# Patient Record
Sex: MALE | Race: BLACK OR AFRICAN AMERICAN | NOT HISPANIC OR LATINO | Employment: FULL TIME | URBAN - METROPOLITAN AREA
[De-identification: names, ages, dates, MRNs, and addresses within clinical notes are randomized per-mention and may not be internally consistent; named-entity substitution may affect disease eponyms.]

---

## 2017-04-01 PROCEDURE — 12032 INTMD RPR S/A/T/EXT 2.6-7.5: CPT

## 2017-04-01 PROCEDURE — 99285 EMERGENCY DEPT VISIT HI MDM: CPT | Mod: 25

## 2017-04-01 RX ORDER — ACETAMINOPHEN 325 MG/1
650 TABLET ORAL
Status: COMPLETED | OUTPATIENT
Start: 2017-04-01 | End: 2017-04-02

## 2017-04-01 RX ORDER — LIDOCAINE HYDROCHLORIDE 10 MG/ML
10 INJECTION INFILTRATION; PERINEURAL
Status: COMPLETED | OUTPATIENT
Start: 2017-04-01 | End: 2017-04-02

## 2017-04-02 ENCOUNTER — HOSPITAL ENCOUNTER (EMERGENCY)
Facility: HOSPITAL | Age: 27
Discharge: HOME | End: 2017-04-02
Attending: EMERGENCY MEDICINE

## 2017-04-02 VITALS
HEART RATE: 76 BPM | DIASTOLIC BLOOD PRESSURE: 67 MMHG | TEMPERATURE: 99 F | WEIGHT: 250 LBS | OXYGEN SATURATION: 97 % | SYSTOLIC BLOOD PRESSURE: 127 MMHG | BODY MASS INDEX: 33.86 KG/M2 | RESPIRATION RATE: 14 BRPM | HEIGHT: 72 IN

## 2017-04-02 DIAGNOSIS — S01.01XA SCALP LACERATION, INITIAL ENCOUNTER: ICD-10-CM

## 2017-04-02 DIAGNOSIS — S02.91XA CLOSED FRACTURE OF SKULL, UNSPECIFIED BONE, INITIAL ENCOUNTER: ICD-10-CM

## 2017-04-02 DIAGNOSIS — S06.9X1A HEAD INJURY, CLOSED, WITH BRIEF LOC: Primary | ICD-10-CM

## 2017-04-02 PROCEDURE — 90471 IMMUNIZATION ADMIN: CPT | Performed by: EMERGENCY MEDICINE

## 2017-04-02 PROCEDURE — 63600175 PHARM REV CODE 636 W HCPCS: Performed by: EMERGENCY MEDICINE

## 2017-04-02 PROCEDURE — 25000003 PHARM REV CODE 250: Performed by: EMERGENCY MEDICINE

## 2017-04-02 PROCEDURE — 90715 TDAP VACCINE 7 YRS/> IM: CPT | Performed by: EMERGENCY MEDICINE

## 2017-04-02 RX ORDER — ONDANSETRON 4 MG/1
4 TABLET, ORALLY DISINTEGRATING ORAL
Status: COMPLETED | OUTPATIENT
Start: 2017-04-02 | End: 2017-04-02

## 2017-04-02 RX ORDER — BUTALBITAL, ACETAMINOPHEN AND CAFFEINE 50; 325; 40 MG/1; MG/1; MG/1
1 TABLET ORAL EVERY 4 HOURS PRN
Qty: 12 TABLET | Refills: 0 | Status: SHIPPED | OUTPATIENT
Start: 2017-04-02 | End: 2017-05-02

## 2017-04-02 RX ORDER — ONDANSETRON 4 MG/1
4 TABLET, ORALLY DISINTEGRATING ORAL EVERY 8 HOURS PRN
Qty: 12 TABLET | Refills: 0 | Status: SHIPPED | OUTPATIENT
Start: 2017-04-02

## 2017-04-02 RX ADMIN — BACITRACIN, NEOMYCIN, POLYMYXIN B 1 EACH: 400; 3.5; 5 OINTMENT TOPICAL at 05:04

## 2017-04-02 RX ADMIN — CLOSTRIDIUM TETANI TOXOID ANTIGEN (FORMALDEHYDE INACTIVATED), CORYNEBACTERIUM DIPHTHERIAE TOXOID ANTIGEN (FORMALDEHYDE INACTIVATED), BORDETELLA PERTUSSIS TOXOID ANTIGEN (GLUTARALDEHYDE INACTIVATED), BORDETELLA PERTUSSIS FILAMENTOUS HEMAGGLUTININ ANTIGEN (FORMALDEHYDE INACTIVATED), BORDETELLA PERTUSSIS PERTACTIN ANTIGEN, AND BORDETELLA PERTUSSIS FIMBRIAE 2/3 ANTIGEN 0.5 ML: 5; 2; 2.5; 5; 3; 5 INJECTION, SUSPENSION INTRAMUSCULAR at 12:04

## 2017-04-02 RX ADMIN — ACETAMINOPHEN 650 MG: 325 TABLET, FILM COATED ORAL at 12:04

## 2017-04-02 RX ADMIN — LIDOCAINE HYDROCHLORIDE 10 ML: 10 INJECTION, SOLUTION INFILTRATION; PERINEURAL at 02:04

## 2017-04-02 RX ADMIN — ONDANSETRON 4 MG: 4 TABLET, ORALLY DISINTEGRATING ORAL at 12:04

## 2017-04-02 NOTE — ED NOTES
Report received from Marshall DE SANTIAGO RN. Patient in no acute distress, respirations even and unlabored, alert awake and oriented. Patient reports nausea and head pain.

## 2017-04-02 NOTE — ED PROVIDER NOTES
Encounter Date: 4/1/2017    SCRIBE #1 NOTE: I, Nathaly Whitmore, am scribing for, and in the presence of,  Ebonie Torres MD. I have scribed the following portions of the note - Other sections scribed: HPI/ROS.       History     Chief Complaint   Patient presents with    Head Injury     Pt reports falling while skating and hitting head and having an LOC.  Laceration to back of head.  Reports pain to head and back of head.      Review of patient's allergies indicates:  No Known Allergies  HPI Comments: CC: Head Injury     HPI: 26 y.o. M with no PMHx presents to the ED via EMS for a head injury PTA. Pt was skating when he tripped on carpet and hit the back of his head on the ground. Friend reports of LOC for less than 10 minutes. Pt is c/o a HA, posterior neck pain, and a laceration to posterior scalp. Symptoms are severe. EMS bandaged the wound. Pt denies N/V, numbness, and any other symptoms.     The history is provided by the patient.     History reviewed. No pertinent past medical history.  History reviewed. No pertinent surgical history.  Family History   Problem Relation Age of Onset    No Known Problems Mother     No Known Problems Father      Social History   Substance Use Topics    Smoking status: Never Smoker    Smokeless tobacco: Never Used    Alcohol use No     Review of Systems   Constitutional: Negative for fever.   HENT: Negative for nosebleeds.    Eyes: Negative for pain and visual disturbance.   Respiratory: Negative for shortness of breath.    Cardiovascular: Negative for chest pain.   Gastrointestinal: Negative for abdominal pain, nausea and vomiting.   Musculoskeletal: Positive for neck pain. Negative for back pain.   Skin: Positive for wound (laceration to posterior scalp). Negative for rash.   Neurological: Positive for headaches.        (+) LOC       Physical Exam   Initial Vitals   BP Pulse Resp Temp SpO2   04/01/17 2309 04/01/17 2309 04/01/17 2309 04/01/17 2309 --   150/90 90 18 98.6  °F (37 °C)      Physical Exam    Nursing note and vitals reviewed.  Constitutional: He appears well-developed and well-nourished. He is active.   HENT:   Head: Normocephalic. Head is without raccoon's eyes.   Nose: No epistaxis.   Hematoma and 2 cm laceration to right posterior scalp.    Eyes: Conjunctivae and EOM are normal.   Neck: Neck supple. No erythema present.   Midline tenderness near C1.  No deformity or step-offs.   Cardiovascular: Normal rate, regular rhythm and normal heart sounds. Exam reveals no friction rub.    No murmur heard.  Pulmonary/Chest: Effort normal and breath sounds normal. No respiratory distress. He has no wheezes. He has no rhonchi. He has no rales. He exhibits no tenderness.   Abdominal: Soft. Normal appearance.   Musculoskeletal:        Thoracic back: He exhibits no bony tenderness.        Lumbar back: He exhibits no bony tenderness.   Neurological: He is alert and oriented to person, place, and time.   Skin: Skin is warm and dry. No rash noted.   Psychiatric: He has a normal mood and affect. His speech is normal.         ED Course   Lac Repair  Date/Time: 4/2/2017 4:14 AM  Performed by: ELISABETH GARCIA  Authorized by: ELISABETH GARCIA   Body area: head/neck  Location details: scalp  Laceration length: 2.8 cm  Foreign bodies: no foreign bodies  Tendon involvement: none  Nerve involvement: none  Vascular damage: no  Patient sedated: no  Preparation: Patient was prepped and draped in the usual sterile fashion.  Irrigation solution: saline  Irrigation method: jet lavage  Amount of cleaning: extensive  Degree of undermining: none  Skin closure: staples  Number of sutures: 5 (staples)  Approximation: close  Approximation difficulty: simple  Dressing: open (no dressing)  Patient tolerance: Patient tolerated the procedure well with no immediate complications    Critical Care  Date/Time: 4/2/2017 4:50 AM  Performed by: ELISABETH GARCIA  Authorized by: ELISABETH GARCIA  RODAS   Direct patient critical care time: 25 minutes  Additional history critical care time: 3 minutes  Ordering / reviewing critical care time: 3 minutes  Documentation critical care time: 10 minutes  Consulting other physicians critical care time: 5 minutes  Total critical care time (exclusive of procedural time) : 46 minutes        Labs Reviewed - No data to display          Medical Decision Making:   History:   Old Medical Records: I decided to obtain old medical records.  26 y.o. male presents emergency department complaining of a headache, [place], sustaining laceration to occipital scalp.  Head CT ordered.  This shows nondisplaced skull fracture involving the occipital bone in the midline.  No evidence of intracranial injury per radiology read. CT results discussed with radiologist, Dr. Garduno.  Cervical spine CT is negative for acute fracture per radiology read.  Neurosurgery consulted to discuss case discussed case.  I spoke to Dr. Valdivia.  He states the patient may be discharged home to follow up in clinic in 2-6 weeks.  Patient has superficial scalp laceration present.  Scalp laceration repaired by me without difficulty per procedure note.  Patient had an episode of vomiting in ED, resolved with antiemetics.  Cranial nerves intact.  Patient given head injury precautions and strict return warnings.  Advised to follow-up in 7-10 days for wound check, staple removal.  Patient advised not to work until seen and cleared by his PCP.  Strongly encouraged close follow-up with neurosurgery.  Patient is nontoxic-appearing.  He was observed in ED for several hours.  No mental status change.  Vomiting resolved.  I believe he is stable for discharge.  Patient and mother state understanding discharge plan and is comfortable going home.            Scribe Attestation:   Scribe #1: I performed the above scribed service and the documentation accurately describes the services I performed. I  attest to the accuracy of the note.    Attending Attestation:           Physician Attestation for Scribe:  Physician Attestation Statement for Scribe #1: I, Ebonie Torres MD, reviewed documentation, as scribed by Nathaly Whitmore in my presence, and it is both accurate and complete.                 ED Course     Clinical Impression:   The primary encounter diagnosis was Head injury, closed, with brief LOC. Diagnoses of Closed fracture of skull, unspecified bone, initial encounter and Scalp laceration, initial encounter were also pertinent to this visit.          Ebonie Torres MD  04/07/17 0259       Ebonie Torres MD  04/07/17 0307

## 2017-04-02 NOTE — ED AVS SNAPSHOT
[facility]  [address] [place] [address]               Zina C Paige   2017 12:38 AM   ED    Description:  Male : 1990   Department:  [facility]           Your Care was Coordinated By:     Provider Role From To    Ebonie Torres MD Attending Provider 17 1894 --      Reason for Visit     Head Injury           Diagnoses this Visit        Comments    Head injury, closed, with brief LOC    -  Primary     Closed fracture of skull, unspecified bone, initial encounter         Scalp laceration, initial encounter           ED Disposition     ED Disposition Condition Comment    Discharge             To Do List           Follow-up Information     Follow up with Marv Valdivia MD.    Specialty:  Neurosurgery    Why:  follow up in neuosurgery clinic in 2-6 weeks.     Contact information:    [address]  [facility] [address] [phone]          Please follow up.    Why:  follow up with PCP or return to ER in 7-10 days for staple removal       These Medications        Disp Refills Start End    butalbital-acetaminophen-caffeine -40 mg (FIORICET, ESGIC) -40 mg per tablet 12 tablet 0 2017    Take 1 tablet by mouth every 4 (four) hours as needed for Pain. - Oral    ondansetron (ZOFRAN-ODT) 4 MG TbDL 12 tablet 0 2017     Take 1 tablet (4 mg total) by mouth every 8 (eight) hours as needed. - Oral      [facility]     [facility] Nurse Care Line - 24/7 Assistance  Unless otherwise directed by your provider, please contact [facility], our nurse care line that is available for 24/7 assistance.     Registered nurses in the [facility] provide: appointment scheduling, clinical advisement, health education, and other advisory services.  Call: [phone] (toll free)               Medications           Message regarding Medications     Verify the changes and/or additions to your  medication regime listed below are the same as discussed with your clinician today.  If any of these changes or additions are incorrect, please notify your healthcare provider.        START taking these NEW medications        Refills    butalbital-acetaminophen-caffeine -40 mg (FIORICET, ESGIC) -40 mg per tablet 0    Sig: Take 1 tablet by mouth every 4 (four) hours as needed for Pain.    Class: Print    Route: Oral    ondansetron (ZOFRAN-ODT) 4 MG TbDL 0    Sig: Take 1 tablet (4 mg total) by mouth every 8 (eight) hours as needed.    Class: Print    Route: Oral      These medications were administered today        Dose Freq    acetaminophen tablet 650 mg 650 mg ED 1 Time    Sig: Take 2 tablets (650 mg total) by mouth ED 1 Time.    Class: Normal    Route: Oral    lidocaine HCL 10 mg/ml (1%) injection 10 mL 10 mL ED 1 Time    Sig: 10 mLs by Infiltration route ED 1 Time.    Class: Normal    Route: Infiltration    Tdap vaccine injection 0.5 mL 0.5 mL ED 1 Time    Sig: Inject 0.5 mLs into the muscle ED 1 Time.    Class: Normal    Route: Intramuscular    ondansetron disintegrating tablet 4 mg 4 mg ED 1 Time    Sig: Take 1 tablet (4 mg total) by mouth ED 1 Time.    Class: Normal    Route: Oral    neomycin-bacitracnZn-polymyxnB packet 1 each 1 packet ED 1 Time    Sig: Apply 1 each topically ED 1 Time.    Class: Normal    Route: Topical (Top)           Verify that the below list of medications is an accurate representation of the medications you are currently taking.  If none reported, the list may be blank. If incorrect, please contact your healthcare provider. Carry this list with you in case of emergency.           Current Medications     butalbital-acetaminophen-caffeine -40 mg (FIORICET, ESGIC) -40 mg per tablet Take 1 tablet by mouth every 4 (four) hours as needed for Pain.    ibuprofen (ADVIL,MOTRIN) 600 MG tablet Take 1 tablet (600 mg total) by mouth every 6 (six) hours as needed for Pain.     neomycin-bacitracnZn-polymyxnB packet 1 each Apply 1 each topically ED 1 Time.    ondansetron (ZOFRAN-ODT) 4 MG TbDL Take 1 tablet (4 mg total) by mouth every 8 (eight) hours as needed.           Clinical Reference Information           Your Vitals Were     BP Pulse Temp Resp Height Weight    127/67 (BP Location: Left arm, Patient Position: Lying, BP Method: Automatic) 93 98.4 °F (36.9 °C) (Oral) 18 6' (1.829 m) 113.4 kg (250 lb)    SpO2 BMI             96% 33.91 kg/m2         Allergies as of 4/2/2017     No Known Allergies      Immunizations Administered on Date of Encounter - 4/2/2017     Name Date Dose VIS Date Route    TDAP 4/2/2017 0.5 mL 2/24/2015 Intramuscular      ED Micro, Lab, POCT     None      ED Imaging Orders     Start Ordered       Status Ordering Provider    04/01/17 2310 04/01/17 2311  CT Cervical Spine Without Contrast  1 time imaging      Final result     04/01/17 2305 04/01/17 2305  CT Head Without Contrast  1 time imaging      Final result         Discharge Instructions       Follow-up in 7-10 days for wound check and staple removal.  Follow-up with primary care doctor in 48 hours for wound check.  Return to emergency department for altered mental status, persistent vomiting, unable to tolerate anything by mouth, fever, redness to wound, pus drainage from wound, or any other concerns.  Apply triple antibiotic ointment to wound 2-3 times a day.    Discharge References/Attachments     LACERATION, SCALP: SUTURES OR STAPLES (ENGLISH)    SKULL FRACTURE (CHILD), UNDERSTANDING (ENGLISH)    CONCUSSION (ENGLISH)      [facility] Sign-Up     Activating your [facility] account is as easy as 1-2-3!     1) Visit [email or web address], select Sign Up Now, enter this activation code and your date of birth, then select Next.  [email or web address]  Expires: 5/17/2017  4:47 AM      2) Create a username and password to use when you visit [facility] in the future and select a security question in case you lose your password and  select Next.    3) Enter your e-mail address and click Sign Up!    Additional Information  If you have questions, please e-mail [email or web address] or call [phone] to talk to our [facility] staff. Remember, [facility] is NOT to be used for urgent needs. For medical emergencies, dial 911.          [facility] complies with applicable Federal civil rights laws and does not discriminate on the basis of race, color, national origin, age, disability, or sex.        Language Assistance Services     ATTENTION: Language assistance services are available, free of charge. Please call [phone].      ATENCIÓN: Si habla español, tiene a kearns disposición servicios gratuitos de asistencia lingüística. Llame al [phone].     CHÚ Ý: N?u b?n nói Ti?ng Vi?t, có các d?ch v? h? tr? ngôn ng? mi?n phí dành cho b?n. G?i s? [phone].

## 2017-04-02 NOTE — DISCHARGE INSTRUCTIONS
Follow-up in 7-10 days for wound check and staple removal.  Follow-up with primary care doctor in 48 hours for wound check.  Return to emergency department for altered mental status, persistent vomiting, unable to tolerate anything by mouth, fever, redness to wound, pus drainage from wound, or any other concerns.  Apply triple antibiotic ointment to wound 2-3 times a day.

## 2017-04-02 NOTE — ED TRIAGE NOTES
Patient comes to ED by ambulance. Patient was skating and fell backwards. Patient has laceration to back of head. Patient experienced LOC. Patient nauseated and actively vomiting. Patient reports slight cough and cold symptoms prior to incident. Will monitor.

## 2017-04-07 ENCOUNTER — TELEPHONE (OUTPATIENT)
Dept: NEUROSURGERY | Facility: CLINIC | Age: 27
End: 2017-04-07

## 2017-04-07 NOTE — TELEPHONE ENCOUNTER
----- Message from Alfreda Bryson sent at 4/7/2017  2:08 PM CDT -----  Contact: Kaya [facility] [phone]  Kaya is calling to get a new patient appt for her son per hospital discharge the incident is work related and patients job is paying the bill, patient does not have insurance, I was not sure if Dr. Choi takes worker's comp cases.

## 2017-04-11 ENCOUNTER — HOSPITAL ENCOUNTER (EMERGENCY)
Facility: HOSPITAL | Age: 27
Discharge: HOME | End: 2017-04-11
Attending: EMERGENCY MEDICINE

## 2017-04-11 VITALS
OXYGEN SATURATION: 96 % | TEMPERATURE: 97 F | DIASTOLIC BLOOD PRESSURE: 72 MMHG | WEIGHT: 245 LBS | RESPIRATION RATE: 18 BRPM | SYSTOLIC BLOOD PRESSURE: 151 MMHG | HEART RATE: 60 BPM | BODY MASS INDEX: 33.18 KG/M2 | HEIGHT: 72 IN

## 2017-04-11 DIAGNOSIS — Z48.02 ENCOUNTER FOR STAPLE REMOVAL: Primary | ICD-10-CM

## 2017-04-11 PROCEDURE — 99281 EMR DPT VST MAYX REQ PHY/QHP: CPT

## 2017-04-11 NOTE — ED TRIAGE NOTES
Patient came in PER personal transport to get staples removed from the back of his head. Placed April 1st.

## 2017-04-11 NOTE — DISCHARGE INSTRUCTIONS
"The patient is discharged to home.  You are to follow up as directed above.  Return to the ED immediately for any new or worsening symptoms: fever, headache, visual change, nausea, vomiting, weakness, dizziness, or any other concerns.        Suture or Staple Removal  You were seen today for a suture or staple removal. Your wound is healing as expected. The wound has healed well enough that the sutures or staples can be removed. The wound will continue to heal for the next few months.  At this time there is no sign of infection.   Home care  · If you have pain, take pain medicine as advised by your healthcare provider.   · Keep your wound clean and protected by covering it with a bandage for the next week or so.   · Wash your hands with soap and warm water before and after caring for your wound. This helps prevent infection.  · Clean the wound gently with soap and warm water daily or as directed by your childs health care provider. Do not use iodine, alcohol, or other cleansers on the wound.  Gently pat it dry. Put on a new bandage, if needed. Do not reuse bandages.  · If the area gets wet, gently pat it dry with a clean cloth. Replace the wet bandage with a dry one.  · Check the wound daily for signs of infection. (These are listed under "When to seek medical advice" below.)  · You may shower and bathe as usual. Swimming is now permitted.  Follow-up care  Follow up with your healthcare provider as advised.  When to seek medical advice   Call your healthcare provider if any of the following occur:  · Wound reopens or bleeds  · Signs of an infection, such as:  ¨ Increasing redness or swelling around the wound  ¨ Increased warmth from the wound  ¨ Worsening pain  ¨ Red streaking lines away from the wound  ¨ Fluid draining from the wound  · Fever of 100.4°F (38°C) or higher, or as directed by your child's healthcare provider  Date Last Reviewed: 9/27/2015  © 2998-2506 [employer] (acquired by [employer]). [address] [place] [address], " SHASHANK Somers [address]. All rights reserved. This information is not intended as a substitute for professional medical care. Always follow your healthcare professional's instructions.

## 2017-04-11 NOTE — ED PROVIDER NOTES
Encounter Date: 4/11/2017       History     Chief Complaint   Patient presents with    Suture / Staple Removal     Needs staples removed from the back of his head     Review of patient's allergies indicates:  No Known Allergies  HPI Comments: Historian:  Patient  CC:  Staple removal  HPI:  This 26 year old male presents to the ED for staple removal.  He had a laceration to his scalp repaired with staples in this ED on 4/2/17.  He was also diagnosed with a nondisplaced skull fracture at that time.  He has not been able to follow up with neurosurgery.  The patient denies fever, headache, nausea, vomiting, dizziness, visual change, and redness/warmth/drainage/pain to laceration.    History reviewed. No pertinent past medical history.  History reviewed. No pertinent surgical history.  Family History   Problem Relation Age of Onset    No Known Problems Mother     No Known Problems Father      Social History   Substance Use Topics    Smoking status: Never Smoker    Smokeless tobacco: Never Used    Alcohol use No     Review of Systems   Constitutional: Negative for fever.   Eyes: Negative for visual disturbance.   Gastrointestinal: Negative for nausea and vomiting.   Musculoskeletal: Negative for gait problem.   Skin: Positive for wound. Negative for color change.   Allergic/Immunologic: Negative for immunocompromised state.   Neurological: Negative for dizziness, seizures, speech difficulty and headaches.   Psychiatric/Behavioral: Negative for confusion.       Physical Exam   Initial Vitals   BP Pulse Resp Temp SpO2   04/11/17 1202 04/11/17 1202 04/11/17 1202 04/11/17 1202 04/11/17 1202   151/72 60 18 97.3 °F (36.3 °C) 96 %     Physical Exam    Constitutional: He appears well-developed and well-nourished. He is cooperative.  Non-toxic appearance. No distress.   HENT:   Head: Normocephalic and atraumatic.   Mouth/Throat: Mucous membranes are normal. No trismus in the jaw.   Eyes: Conjunctivae, EOM and lids are  normal. Pupils are equal, round, and reactive to light.   Neck: Trachea normal, normal range of motion, full passive range of motion without pain and phonation normal. Neck supple. No stridor present.   Cardiovascular: Normal rate, regular rhythm and normal heart sounds. Exam reveals no gallop.    Pulmonary/Chest: Effort normal and breath sounds normal. No tachypnea. No respiratory distress. He has no decreased breath sounds. He has no wheezes. He has no rhonchi. He has no rales.   Neurological: He is alert and oriented to person, place, and time. He has normal strength. No sensory deficit.   Skin: Skin is warm and dry.   +Well-healed laceration to the right occipital scalp with 5 staples in place - no associated erythema, warmth, induration, drainage, or tenderness to palpation.         ED Course   Suture Removal  Date/Time: 4/11/2017 12:37 PM  Performed by: YARED PRYOR  Authorized by: SALVADOR ORTEGA   Body area: head/neck  Location details: scalp  Wound Appearance: clean, well healed, nontender and no drainage  Staples Removed: 5  Post-removal: no dressing applied  Facility: sutures placed in this facility  Complications: No  Patient tolerance: Patient tolerated the procedure well with no immediate complications        Labs Reviewed - No data to display             Additional MDM:   Comments: Patient presents for staple removal.  He is afebrile and nontoxic appearing.  Wound is well-healed and without evidence of infection.  Staples were removed as per the procedure note.  Of note, patient had a nondisplaced skull fracture noted on CT of the head from previous visit.  He is to follow up with neurosurgery, however he is unsure if they will take his workman's compensation.  He does not have headache, nausea, vomiting, dizziness, or visual change.  I have encouraged him to call the neurosurgeon's office to follow up on above issue.  He has also been provided with another neurosurgeon's information.  Careful  ED warnings and return instructions given.  This patient's case was discussed with Dr. Mathias, he is in agreement with the assessment and plan..            Attending Attestation:     Physician Attestation Statement for NP/PA:   I discussed this assessment and plan of this patient with the NP/PA, but I did not personally examine the patient. The face to face encounter was performed by the NP/PA.                  ED Course     Clinical Impression:   The encounter diagnosis was Encounter for staple removal.          SHASHANK Santos  04/11/17 1413       Andre Mathias MD  04/13/17 1400

## 2025-07-09 NOTE — ED AVS SNAPSHOT
[facility]  [address] [place] [address]               Zina SARAH Gibson   2017 12:21 PM   ED    Description:  Male : 1990   Department:  [facility]           Your Care was Coordinated By:     Provider Role From To    Andre Mathias MD Attending Provider 17 1221 --    SHASHANK Santos Physician Assistant 17 1221 --      Reason for Visit     Suture / Staple Removal           Diagnoses this Visit        Comments    Encounter for staple removal    -  Primary       ED Disposition     None           To Do List           Follow-up Information     Follow up with Andre Marte MD.    Specialty:  Neurosurgery    Why:  If you are unable to follow up with neurosurgery as previously advised, you may follow up with Dr. Marte within 1 week.  Call to schedule an appointment.    Contact information:    [address] [place] [address] [place] [id number]  [phone]        [facility]     [facility] -  Assistance  Unless otherwise directed by your provider, please contact [facility], our nurse care line that is available for  assistance.     Registered nurses in the [facility] provide: appointment scheduling, clinical advisement, health education, and other advisory services.  Call: [phone] (toll free)               Medications           Message regarding Medications     Verify the changes and/or additions to your medication regime listed below are the same as discussed with your clinician today.  If any of these changes or additions are incorrect, please notify your healthcare provider.             Verify that the below list of medications is an accurate representation of the medications you are currently taking.  If none reported, the list may be blank. If incorrect, please contact your healthcare provider. Carry this list with you in case of emergency.           Current Medications   TSH WNL 3.8 (6/25/25)  Continue home dose of levothyroxine       butalbital-acetaminophen-caffeine -40 mg (FIORICET, ESGIC) -40 mg per tablet Take 1 tablet by mouth every 4 (four) hours as needed for Pain.    ibuprofen (ADVIL,MOTRIN) 600 MG tablet Take 1 tablet (600 mg total) by mouth every 6 (six) hours as needed for Pain.    ondansetron (ZOFRAN-ODT) 4 MG TbDL Take 1 tablet (4 mg total) by mouth every 8 (eight) hours as needed.           Clinical Reference Information           Your Vitals Were     BP Pulse Temp Resp Height Weight    151/72 60 97.3 °F (36.3 °C) 18 6' (1.829 m) 111.1 kg (245 lb)    SpO2 BMI             96% 33.23 kg/m2         Allergies as of 4/11/2017     No Known Allergies      Immunizations Administered on Date of Encounter - 4/11/2017     None      ED Micro, Lab, POCT     None      ED Imaging Orders     None        Discharge Instructions       The patient is discharged to home.  You are to follow up as directed above.  Return to the ED immediately for any new or worsening symptoms: fever, headache, visual change, nausea, vomiting, weakness, dizziness, or any other concerns.        Suture or Staple Removal  You were seen today for a suture or staple removal. Your wound is healing as expected. The wound has healed well enough that the sutures or staples can be removed. The wound will continue to heal for the next few months.  At this time there is no sign of infection.   Home care  · If you have pain, take pain medicine as advised by your healthcare provider.   · Keep your wound clean and protected by covering it with a bandage for the next week or so.   · Wash your hands with soap and warm water before and after caring for your wound. This helps prevent infection.  · Clean the wound gently with soap and warm water daily or as directed by your childs health care provider. Do not use iodine, alcohol, or other cleansers on the wound.  Gently pat it dry. Put on a new bandage, if needed. Do not reuse bandages.  · If the area gets wet, gently pat it  "dry with a clean cloth. Replace the wet bandage with a dry one.  · Check the wound daily for signs of infection. (These are listed under "When to seek medical advice" below.)  · You may shower and bathe as usual. Swimming is now permitted.  Follow-up care  Follow up with your healthcare provider as advised.  When to seek medical advice   Call your healthcare provider if any of the following occur:  · Wound reopens or bleeds  · Signs of an infection, such as:  ¨ Increasing redness or swelling around the wound  ¨ Increased warmth from the wound  ¨ Worsening pain  ¨ Red streaking lines away from the wound  ¨ Fluid draining from the wound  · Fever of 100.4°F (38°C) or higher, or as directed by your child's healthcare provider  Date Last Reviewed: 9/27/2015  © 0187-8871 [employer]. [address] [place] [address]. All rights reserved. This information is not intended as a substitute for professional medical care. Always follow your healthcare professional's instructions.          [facility] Sign-Up     Activating your [facility] account is as easy as 1-2-3!     1) Visit [email or web address], select Sign Up Now, enter this activation code and your date of birth, then select Next.  [email or web address]  Expires: 5/17/2017  4:47 AM      2) Create a username and password to use when you visit [facility] in the future and select a security question in case you lose your password and select Next.    3) Enter your e-mail address and click Sign Up!    Additional Information  If you have questions, please e-mail [email or web address] or call [phone] to talk to our [facility] staff. Remember, [facility] is NOT to be used for urgent needs. For medical emergencies, dial 911.          [facility] complies with applicable Federal civil rights laws and does not discriminate on the basis of race, color, national origin, age, disability, or sex.        Language Assistance Services     ATTENTION: Language " assistance services are available, free of charge. Please call [phone].      ATENCIÓN: Si habla español, tiene a kearns disposición servicios gratuitos de asistencia lingüística. Llame al [phone].     CHÚ Ý: N?u b?n nói Ti?ng Vi?t, có các d?ch v? h? tr? ngôn ng? mi?n phí dành cho b?n. G?i s? [phone].